# Patient Record
Sex: MALE | Race: WHITE
[De-identification: names, ages, dates, MRNs, and addresses within clinical notes are randomized per-mention and may not be internally consistent; named-entity substitution may affect disease eponyms.]

---

## 2018-09-27 ENCOUNTER — HOSPITAL ENCOUNTER (OUTPATIENT)
Dept: HOSPITAL 61 - PCVCCLINIC | Age: 78
Discharge: HOME | End: 2018-09-27
Attending: INTERNAL MEDICINE
Payer: MEDICARE

## 2018-09-27 DIAGNOSIS — I10: ICD-10-CM

## 2018-09-27 DIAGNOSIS — Z88.0: ICD-10-CM

## 2018-09-27 DIAGNOSIS — F17.210: ICD-10-CM

## 2018-09-27 DIAGNOSIS — E78.5: ICD-10-CM

## 2018-09-27 DIAGNOSIS — I25.10: Primary | ICD-10-CM

## 2018-09-27 DIAGNOSIS — R42: ICD-10-CM

## 2018-09-27 PROCEDURE — G0463 HOSPITAL OUTPT CLINIC VISIT: HCPCS

## 2018-09-27 PROCEDURE — 93005 ELECTROCARDIOGRAM TRACING: CPT

## 2018-09-28 ENCOUNTER — HOSPITAL ENCOUNTER (OUTPATIENT)
Dept: HOSPITAL 61 - PCVCIMAG | Age: 78
Discharge: HOME | End: 2018-09-28
Attending: INTERNAL MEDICINE
Payer: MEDICARE

## 2018-09-28 DIAGNOSIS — R42: ICD-10-CM

## 2018-09-28 DIAGNOSIS — E78.5: ICD-10-CM

## 2018-09-28 DIAGNOSIS — I25.10: Primary | ICD-10-CM

## 2018-09-28 PROCEDURE — 93306 TTE W/DOPPLER COMPLETE: CPT

## 2018-09-28 NOTE — PCVCIMAG
--------------- APPROVED REPORT --------------





Study performed:  09/28/2018 12:11:07



EXAM: Comprehensive 2D, Doppler, and color-flow 

Echocardiogram

Patient Location: Echo lab

Status:  routine



BSA:         2.02

HR: 76 bpmBP:          116/80 mmHg

Rhythm: NSR



Other Information 

Study Quality: Good



Risk Factors: 

Cardiac Risk Factors:  Hyperlipidemia



Indications

Dizziness and Vertigo 

CAD



2D Dimensions

IVSd:  10.88 (7-11mm)LVOT Diam:  23.00 (18-24mm) 

LVDd:  44.02 mm

PWd:  10.97 (7-11mm)Ascending Ao:  39.79 (22-36mm)

LVDs:  28.37 (25-40mm)

Left Atrium:  37.32 (27-40mm)

Aortic Root:  35.02 mm

LV Single Plane 4CH:  56.87 %

LV Single Plane 2CH:  61.62 %

Biplane EF:  61.0 %



Volumes

Left Atrial Volume (Systole)

Single Plane 4CH:  45.24 mLSingle Plane 2CH:  76.40 mL

LA ESV Index:  7.00 mL/m2



Aortic Valve

AoV Peak Farhad.:  1.38 m/s

AO Peak Gr.:  7.64 mmHgLVOT Max PG:  3.07 mmHg

LVOT Max V:  0.88 m/s

VERNON Vmax: 2.65 cm2



Mitral Valve

E/A Ratio:  0.7

MV Decel. Time:  207.85 ms

MV E Max Farhad.:  0.68 m/s

MV A Farhad.:  1.02 m/s

IVRT:  69.20 ms



TDI

E/Lateral E':  7.56E/Medial E':  11.33

Medial E' Farhad.:  0.06 m/s

Lateral E' Farhad.:  0.09 m/s



Pulmonary Valve

PV Peak Farhad.:  0.86 m/sPV Peak Gr.:  2.99 mmHg



Pulmonary Vein

P Vein S:    0.25 m/sP Vein A:  0.33 m/s

P Vein D:   0.52 m/sP Vein A Dur.:  96.9 msec

P Vein S/D Ratio:  0.48



Tricuspid Valve

TR Peak Farhad.:  2.58 m/sRAP Estimate:  7.00 mmHg

TR Peak Gr.:  26.56 mmHg

PA Pressure:  34.00 mmHg



Left Ventricle

The left ventricle is normal size. There is normal LV segmental wall 

motion. Borderline concentric left ventricular hypertrophy. Left 

ventricular systolic function is normal. The left ventricular 

ejection fraction is within the normal range. LVEF is 60-65%. Grade I 

- abnormal relaxation pattern.



Right Ventricle

The right ventricle is normal size. The right ventricular systolic 

function is normal.



Atria

The left atrium size is normal. The right atrium size is 

normal.



Aortic Valve

The aortic valve is normal in structure. Trace aortic regurgitation. 

There is no aortic valvular stenosis.



Mitral Valve

The mitral valve is normal in structure. Trace mitral regurgitation. 

No evidence of mitral valve stenosis.



Tricuspid Valve

The tricuspid valve is normal in structure. Trace tricuspid 

regurgitation. Pulmonary artery pressure is 34 mmHg.



Pulmonic Valve

The pulmonary valve is normal in structure. There is no pulmonic 

valvular regurgitation.



Great Vessels

The aortic root is normal in size. IVC is normal in size and 

collapses &gt;50% with inspiration.



Pericardium

There is no pericardial effusion.



&lt;Conclusion&gt;

The left ventricle is normal size.

LVEF is 60-65%.

The aortic valve is normal in structure.

Trace aortic regurgitation.

The mitral valve is normal in structure.

Trace mitral regurgitation.

The tricuspid valve is normal in structure.

Trace tricuspid regurgitation. Pulmonary artery pressure is 34 

mmHg.

The pulmonary valve is normal in structure.

There is no pericardial effusion.

## 2018-10-17 ENCOUNTER — HOSPITAL ENCOUNTER (OUTPATIENT)
Dept: HOSPITAL 61 - PCVCIMAG | Age: 78
Discharge: HOME | End: 2018-10-17
Attending: INTERNAL MEDICINE
Payer: MEDICARE

## 2018-10-17 DIAGNOSIS — I25.10: Primary | ICD-10-CM

## 2018-10-17 DIAGNOSIS — R42: ICD-10-CM

## 2018-10-17 DIAGNOSIS — F17.200: ICD-10-CM

## 2018-10-17 PROCEDURE — 93017 CV STRESS TEST TRACING ONLY: CPT

## 2018-10-17 PROCEDURE — A9500 TC99M SESTAMIBI: HCPCS

## 2018-10-17 PROCEDURE — 78452 HT MUSCLE IMAGE SPECT MULT: CPT

## 2018-10-22 NOTE — PCVCIMAG
--------------- APPROVED REPORT --------------





Imaging Protocol: Rest Tc-99m/Stress Tc-99m 1 day

Study performed:  10/17/2018 13:27:05



Indication: CAD, Dizzy

Patient Location: Out-Patient

Stress Nurse: Alison Nguyễn RN

NM Tech:Cee Jose [facility]



Ht: 5 ft 9 in Wt: 190 lbs BSA:  2.02 m2

HR: 99 bpm                      BP: 155/95 mmHg         BMI:  

28.05

Rhythm:  NSR, RVCD



Medical History

Medical History: HTN, CAD, Current Smoker, Age

Medications: Carvedilol, Amlodipine, Keppra, ASA, Zantac, 

Quinapril

Allergies: PCN

Pretest Chest Pain Characteristics: No chest pain

Exercise History: Sedentary

Physical Disabilities: Back, Hips, Knees, Balance

Meds Held (24 hrs): Carvedilol



Resting Data

Rest SPECT myocardial perfusion imaging was performed in supine 

position 45 minutes following the intravenous injection of 10.9 mCi 

of Tc-99m Sestamibi.

Time of rest injection: 1345     Date: 10/17/2018

Administration Route: IV

Administration Site: Right AC



Pharmacologic Stress

Pharmacologic stress test was performed by injecting Regadenoson 0.4 

mg IV push over 10-15 seconds immediately followed by the intravenous 

injection of 32.3 mCi of Tc-99m Sestamibi.

Time of stress injection: 1415     Date: 10/17/2018

Administration Route: IV

Administration Site: Right AC

Gated Stress SPECT was performed 45 minutes after stress 

injection.

The images were gated to evaluate regional wall motion and calculate 

left ventricular ejection fraction. 



Stress Test Details

Stress Test:  Pharmacologic stress testing performed using 0.4 mg of 

regadenoson per 5 mL given IV over 10 seconds.

  Reason for pharmacologic stress test: physical limitation.



HRMax Heart Rate (APMHR): 143 bpm 

Resting HR:            99 bpmTarget HR (85% APMHR): 121 bpm

Max HR Achieved:  116 bpm

% of APMHR:         81

Recovery HR:            106 bpm



BP

Resting BP:  155/95 mmHg



Recovery BP:       144/91 mmHg

ECG

Resting ECG:  NSR, RVCD

Stress ECG:     ST, RVCD

Recovery ECG: SR, RVCD



Clinical

Reason for Termination: Completed protocol

Stress Symptoms: Lightheaded

Exercise duration: 0 min 55 sec

Symptoms resolved with caffeine.



Stress ECG Conclusion

1. Adequate response to intravenous Lexiscan

2. Inadequate heart rate for ECG diagnosis



Study Data

Post stress, the left ventricular ejection was 53%..

SSS: 4

SRS: 3

SDS: 3

TID = 1.35.



Perfusion

There is a large area of severely reduced uptake in the entire 

segment of the inferior wall which is seen on the stress images as 

well as the resting images.

This area thickens and moves normally and is most consistent with 

attenuation artifact.



Nuclear Conclusion

ECG Findings: non-diagnostic 

Clinical Findings: negative for ischemia 

Nuclear Findings: negative for ischemia 

Exercise Capacity: not assessed

Left Ventricular Function: normal 

1. Low risk study 



&lt;Conclusion&gt;

1. Adequate response to intravenous Lexiscan

2. Inadequate heart rate for ECG diagnosis

## 2018-10-25 ENCOUNTER — HOSPITAL ENCOUNTER (OUTPATIENT)
Dept: HOSPITAL 61 - PCVCCLINIC | Age: 78
Discharge: HOME | End: 2018-10-25
Attending: INTERNAL MEDICINE
Payer: MEDICARE

## 2018-10-25 DIAGNOSIS — E78.5: ICD-10-CM

## 2018-10-25 DIAGNOSIS — F17.210: ICD-10-CM

## 2018-10-25 DIAGNOSIS — I25.10: Primary | ICD-10-CM

## 2018-10-25 DIAGNOSIS — Z79.82: ICD-10-CM

## 2018-10-25 DIAGNOSIS — Z88.0: ICD-10-CM

## 2018-10-25 DIAGNOSIS — I10: ICD-10-CM

## 2018-10-25 PROCEDURE — G0463 HOSPITAL OUTPT CLINIC VISIT: HCPCS
